# Patient Record
Sex: FEMALE | HISPANIC OR LATINO | Employment: UNEMPLOYED | URBAN - METROPOLITAN AREA
[De-identification: names, ages, dates, MRNs, and addresses within clinical notes are randomized per-mention and may not be internally consistent; named-entity substitution may affect disease eponyms.]

---

## 2024-01-17 ENCOUNTER — OFFICE VISIT (OUTPATIENT)
Dept: URGENT CARE | Facility: URGENT CARE | Age: 44
End: 2024-01-17

## 2024-01-17 VITALS
DIASTOLIC BLOOD PRESSURE: 78 MMHG | WEIGHT: 160 LBS | SYSTOLIC BLOOD PRESSURE: 106 MMHG | TEMPERATURE: 98.4 F | HEART RATE: 95 BPM | OXYGEN SATURATION: 100 %

## 2024-01-17 DIAGNOSIS — N92.1 MENORRHAGIA WITH IRREGULAR CYCLE: Primary | ICD-10-CM

## 2024-01-17 LAB — HGB BLD-MCNC: 12.5 G/DL (ref 11.7–15.7)

## 2024-01-17 PROCEDURE — 99203 OFFICE O/P NEW LOW 30 MIN: CPT | Performed by: FAMILY MEDICINE

## 2024-01-17 PROCEDURE — 85018 HEMOGLOBIN: CPT | Performed by: FAMILY MEDICINE

## 2024-01-17 PROCEDURE — 36415 COLL VENOUS BLD VENIPUNCTURE: CPT | Performed by: FAMILY MEDICINE

## 2024-01-17 RX ORDER — DICLOFENAC SODIUM 75 MG/1
75 TABLET, DELAYED RELEASE ORAL 2 TIMES DAILY
Qty: 10 TABLET | Refills: 0 | Status: SHIPPED | OUTPATIENT
Start: 2024-01-17 | End: 2024-01-22

## 2024-01-17 RX ORDER — MEDROXYPROGESTERONE ACETATE 10 MG
10 TABLET ORAL 3 TIMES DAILY
Qty: 15 TABLET | Refills: 0 | Status: SHIPPED | OUTPATIENT
Start: 2024-01-17 | End: 2024-01-22

## 2024-01-17 ASSESSMENT — PAIN SCALES - GENERAL: PAINLEVEL: SEVERE PAIN (7)

## 2024-01-17 NOTE — PATIENT INSTRUCTIONS
Hemoglobin today is 12.5, which is normal.    Start diclofenac 75 mg twice daily for the next 5 days.  This medicine should help with the cramping and pain.    If you are still bleeding heavily after using diclofenac for two days, fill the prescription for Provera and start using this three times daily for 5 days.  This medicine is useful for stabilizing the uterine lining and should (hopefully!) help to reduce the heavy bleeding.

## 2024-01-17 NOTE — PROGRESS NOTES
ICD-10-CM    1. Menorrhagia with irregular cycle  N92.1 Hemoglobin     Hemoglobin     diclofenac (VOLTAREN) 75 MG EC tablet     medroxyPROGESTERone (PROVERA) 10 MG tablet        Hgb 12.5 which is comparable to previous result in Epic.    PLAN:  Patient Instructions   Hemoglobin today is 12.5, which is normal.    Start diclofenac 75 mg twice daily for the next 5 days.  This medicine should help with the cramping and pain.    If you are still bleeding heavily after using diclofenac for two days, fill the prescription for Provera and start using this three times daily for 5 days.  This medicine is useful for stabilizing the uterine lining and should (hopefully!) help to reduce the heavy bleeding.    SUBJECTIVE:  Lisa Mayer is a 43 year old female who presents to  today with heavy menstrual bleeding since Monday night.  Very painful cramps, on the level of labor pains.  Has been passing some clots.  Changing pad about every 2 hours at most.  Has been using Tylenol to try to help the pain, but this is not helping.  Not feeling lightheaded.    Her previous period ended on December 20.  She has somewhat irregular cycles at baseline.  She has a known L ovarian cyst that OB/gyn had recommended removal of, but she states she wasn't quite ready to do that when they recommended the surgery.  S/p TL.    OBJECTIVE:  /78   Pulse 95   Temp 98.4  F (36.9  C) (Tympanic)   Wt 72.6 kg (160 lb)   LMP 01/15/2024 (Exact Date)   SpO2 100%   GEN: well-appearing, in NAD  Abd: soft, active bowel sounds, not distended, generalized lower abdominal tenderness, worst in suprapubic area    Results for orders placed or performed in visit on 01/17/24   Hemoglobin     Status: Normal   Result Value Ref Range    Hemoglobin 12.5 11.7 - 15.7 g/dL